# Patient Record
Sex: MALE | Race: WHITE | NOT HISPANIC OR LATINO | Employment: UNEMPLOYED | URBAN - NONMETROPOLITAN AREA
[De-identification: names, ages, dates, MRNs, and addresses within clinical notes are randomized per-mention and may not be internally consistent; named-entity substitution may affect disease eponyms.]

---

## 2017-01-03 ENCOUNTER — OFFICE VISIT (OUTPATIENT)
Dept: SURGERY | Facility: CLINIC | Age: 37
End: 2017-01-03

## 2017-01-03 VITALS — HEART RATE: 99 BPM | SYSTOLIC BLOOD PRESSURE: 166 MMHG | WEIGHT: 264 LBS | DIASTOLIC BLOOD PRESSURE: 93 MMHG

## 2017-01-03 DIAGNOSIS — K35.33 APPENDICITIS, ACUTE, WITH PERITONITIS: Primary | ICD-10-CM

## 2017-01-03 PROCEDURE — 99024 POSTOP FOLLOW-UP VISIT: CPT | Performed by: SURGERY

## 2017-01-03 RX ORDER — LISINOPRIL 2.5 MG/1
2.5 TABLET ORAL DAILY
COMMUNITY

## 2017-01-03 RX ORDER — GEMFIBROZIL 600 MG/1
600 TABLET, FILM COATED ORAL
COMMUNITY

## 2017-01-03 NOTE — PROGRESS NOTES
1/3/2017    Patient Information  Imer Acuna  [address] [facility] [place] [address]  1980  [phone] (home) [phone] (work)    Chief Complaint   Patient presents with   • Follow-up     appendicitis taken out         HPI  Patient is a 36-year-old white male 12 days status post laparoscopic appendectomy.  He is doing well        Review of Systems:    General: negative  Integumentary: negative  Eyes: negative  ENT: negative  Respiratory: negative  Gastrointestinal: negative  Cardiovascular: negative  Neurological: negative  Psychiatric: negative  Hematologic/Lymphatic: negative  Genitourinary: negative  Musculoskeletal: negative  Endocrine: negative  Breasts: negative      There is no problem list on file for this patient.        Past Medical History   Diagnosis Date   • Arthritis    • Hypertension    • Migraine          History reviewed. No pertinent past surgical history.      Family History   Problem Relation Age of Onset   • Hypertension Mother    • Diabetes Mother    • Hypertension Maternal Aunt          Social History   Substance Use Topics   • Smoking status: Never Smoker   • Smokeless tobacco: Never Used   • Alcohol use No       Current Outpatient Prescriptions   Medication Sig Dispense Refill   • lisinopril (PRINIVIL,ZESTRIL) 2.5 MG tablet Take 2.5 mg by mouth Daily.       No current facility-administered medications for this visit.          Allergies  Review of patient's allergies indicates no known allergies.      Physical Exam  Wounds look excellent    Visit Vitals   • /93   • Pulse 99   • Wt 264 lb (120 kg)         ASSESSMENT  Status post laparoscopic appendectomy doing well        PLAN    Return when necessary        Rojas Sarah MD

## 2017-01-03 NOTE — MR AVS SNAPSHOT
Imer Acuna   1/3/2017 10:55 AM   Office Visit    Dept Phone:  [phone]   Encounter #:  [id number]    Provider:  Rojas Sarah MD   Department:  [facility] GENERAL SURGERY                Your Full Care Plan              Your Updated Medication List          This list is accurate as of: 1/3/17 11:13 AM.  Always use your most recent med list.                lisinopril 2.5 MG tablet   Commonly known as:  PRINIVIL,ZESTRIL               Instructions     None    Patient Instructions History      Upcoming Appointments     Visit Type Date Time Department    HOSPITAL FOLLOW UP 1/3/2017 10:55 AM [facility] [place]      MyChart Signup     [facility] allows you to send messages to your doctor, view your test results, renew your prescriptions, schedule appointments, and more. To sign up, go to [employer] and click on the Sign Up Now link in the New User? box. Enter your [employer] Activation Code exactly as it appears below along with the last four digits of your Social Security Number and your Date of Birth () to complete the sign-up process. If you do not sign up before the expiration date, you must request a new code.    [employer] Activation Code: [email or web address]  Expires: 2017 11:13 AM    If you have questions, you can email [email or web address] or call [phone] to talk to our [employer] staff. Remember, [employer] is NOT to be used for urgent needs. For medical emergencies, dial 911.               Other Info from Your Visit           Allergies     No Known Allergies      Reason for Visit     Follow-up appendicitis taken out      Vital Signs     Blood Pressure Pulse Weight Smoking Status          166/93 99 264 lb (120 kg) Never Smoker